# Patient Record
Sex: MALE | Race: WHITE
[De-identification: names, ages, dates, MRNs, and addresses within clinical notes are randomized per-mention and may not be internally consistent; named-entity substitution may affect disease eponyms.]

---

## 2021-04-26 ENCOUNTER — HOSPITAL ENCOUNTER (INPATIENT)
Dept: HOSPITAL 79 - ER1 | Age: 51
LOS: 2 days | Discharge: HOME | DRG: 189 | End: 2021-04-28
Attending: INTERNAL MEDICINE | Admitting: INTERNAL MEDICINE
Payer: COMMERCIAL

## 2021-04-26 VITALS — BODY MASS INDEX: 44.1 KG/M2 | WEIGHT: 315 LBS | HEIGHT: 71 IN

## 2021-04-26 DIAGNOSIS — Z83.3: ICD-10-CM

## 2021-04-26 DIAGNOSIS — D75.1: ICD-10-CM

## 2021-04-26 DIAGNOSIS — Z20.822: ICD-10-CM

## 2021-04-26 DIAGNOSIS — J96.22: ICD-10-CM

## 2021-04-26 DIAGNOSIS — Z79.4: ICD-10-CM

## 2021-04-26 DIAGNOSIS — I10: ICD-10-CM

## 2021-04-26 DIAGNOSIS — E11.9: ICD-10-CM

## 2021-04-26 DIAGNOSIS — J96.21: Primary | ICD-10-CM

## 2021-04-26 DIAGNOSIS — D69.6: ICD-10-CM

## 2021-04-26 DIAGNOSIS — J44.1: ICD-10-CM

## 2021-04-26 DIAGNOSIS — E66.2: ICD-10-CM

## 2021-04-26 DIAGNOSIS — Z82.49: ICD-10-CM

## 2021-04-26 DIAGNOSIS — R91.1: ICD-10-CM

## 2021-04-26 DIAGNOSIS — F17.210: ICD-10-CM

## 2021-04-26 LAB
BUN/CREATININE RATIO: 22 (ref 0–10)
HGB BLD-MCNC: 19.6 GM/DL (ref 14–17.5)
RED BLOOD COUNT: 6.68 M/UL (ref 4.2–5.5)
WHITE BLOOD COUNT: 8 K/UL (ref 4.5–11)

## 2021-04-27 LAB
BUN/CREATININE RATIO: 26 (ref 0–10)
HGB BLD-MCNC: 20.2 GM/DL (ref 14–17.5)
RED BLOOD COUNT: 6.98 M/UL (ref 4.2–5.5)
WHITE BLOOD COUNT: 8.8 K/UL (ref 4.5–11)

## 2021-04-28 LAB
BUN/CREATININE RATIO: 30 (ref 0–10)
HGB BLD-MCNC: 18.8 GM/DL (ref 14–17.5)
RED BLOOD COUNT: 6.75 M/UL (ref 4.2–5.5)
WHITE BLOOD COUNT: 12.9 K/UL (ref 4.5–11)

## 2021-05-03 ENCOUNTER — HOSPITAL ENCOUNTER (OUTPATIENT)
Dept: HOSPITAL 79 - SLEEP | Age: 51
End: 2021-05-03
Attending: INTERNAL MEDICINE
Payer: COMMERCIAL

## 2021-05-03 DIAGNOSIS — G47.33: Primary | ICD-10-CM

## 2021-05-03 DIAGNOSIS — G47.36: ICD-10-CM

## 2021-10-18 ENCOUNTER — HOSPITAL ENCOUNTER (OUTPATIENT)
Dept: HOSPITAL 79 - HEART 5 | Age: 51
End: 2021-10-18
Attending: INTERNAL MEDICINE
Payer: COMMERCIAL

## 2021-10-18 DIAGNOSIS — R06.02: Primary | ICD-10-CM

## 2022-01-22 ENCOUNTER — HOSPITAL ENCOUNTER (INPATIENT)
Dept: HOSPITAL 79 - ER1 | Age: 52
LOS: 6 days | Discharge: HOME | DRG: 291 | End: 2022-01-28
Attending: INTERNAL MEDICINE | Admitting: HOSPITALIST
Payer: COMMERCIAL

## 2022-01-22 VITALS — HEIGHT: 71 IN | BODY MASS INDEX: 44.1 KG/M2 | WEIGHT: 315 LBS

## 2022-01-22 DIAGNOSIS — E11.9: ICD-10-CM

## 2022-01-22 DIAGNOSIS — D75.1: ICD-10-CM

## 2022-01-22 DIAGNOSIS — I50.9: ICD-10-CM

## 2022-01-22 DIAGNOSIS — Z79.4: ICD-10-CM

## 2022-01-22 DIAGNOSIS — D69.6: ICD-10-CM

## 2022-01-22 DIAGNOSIS — Z83.3: ICD-10-CM

## 2022-01-22 DIAGNOSIS — I11.0: Primary | ICD-10-CM

## 2022-01-22 DIAGNOSIS — J44.1: ICD-10-CM

## 2022-01-22 DIAGNOSIS — E87.6: ICD-10-CM

## 2022-01-22 DIAGNOSIS — G47.33: ICD-10-CM

## 2022-01-22 DIAGNOSIS — R57.8: ICD-10-CM

## 2022-01-22 DIAGNOSIS — T42.75XA: ICD-10-CM

## 2022-01-22 DIAGNOSIS — Z82.49: ICD-10-CM

## 2022-01-22 DIAGNOSIS — Z99.81: ICD-10-CM

## 2022-01-22 DIAGNOSIS — Z20.822: ICD-10-CM

## 2022-01-22 DIAGNOSIS — N17.9: ICD-10-CM

## 2022-01-22 DIAGNOSIS — E66.2: ICD-10-CM

## 2022-01-22 DIAGNOSIS — J96.21: ICD-10-CM

## 2022-01-22 DIAGNOSIS — Z79.84: ICD-10-CM

## 2022-01-22 DIAGNOSIS — J96.22: ICD-10-CM

## 2022-01-22 DIAGNOSIS — Z79.82: ICD-10-CM

## 2022-01-22 DIAGNOSIS — F17.210: ICD-10-CM

## 2022-01-22 LAB
BUN/CREATININE RATIO: 17 (ref 0–10)
HGB BLD-MCNC: 21.2 GM/DL (ref 14–17.5)
RED BLOOD COUNT: 6.65 M/UL (ref 4.2–5.5)
WHITE BLOOD COUNT: 8.1 K/UL (ref 4.5–11)

## 2022-01-23 PROCEDURE — B24BZZZ ULTRASONOGRAPHY OF HEART WITH AORTA: ICD-10-PCS | Performed by: INTERNAL MEDICINE

## 2022-01-23 PROCEDURE — 5A09457 ASSISTANCE WITH RESPIRATORY VENTILATION, 24-96 CONSECUTIVE HOURS, CONTINUOUS POSITIVE AIRWAY PRESSURE: ICD-10-PCS | Performed by: HOSPITALIST

## 2022-01-24 LAB
BUN/CREATININE RATIO: 36 (ref 0–10)
HGB BLD-MCNC: 21.8 GM/DL (ref 14–17.5)
RED BLOOD COUNT: 6.85 M/UL (ref 4.2–5.5)
WHITE BLOOD COUNT: 4.6 K/UL (ref 4.5–11)

## 2022-01-25 PROCEDURE — 3E033XZ INTRODUCTION OF VASOPRESSOR INTO PERIPHERAL VEIN, PERCUTANEOUS APPROACH: ICD-10-PCS | Performed by: HOSPITALIST

## 2022-01-25 NOTE — NUR
PT BECOMING INCREASINGLY MORE CONFUSED AND RESTLESS. CONTINUES TO PULL OFF HIS
BIPAP MASK AND OXYGEN DESPITE BEING EDUCATED ABOUT HIS LABS AND NEED FOR IT.
02 SAT DROPS TO LOW 70'S. HE DOES SEEM CONFUSED ABOUT WHERE HE IS. NOTIFIED DR SEGURA AND ORDERS RECIEVED AND PLACED.

## 2022-01-26 LAB
BUN/CREATININE RATIO: 56 (ref 0–10)
HGB BLD-MCNC: 21.3 GM/DL (ref 14–17.5)
RED BLOOD COUNT: 6.85 M/UL (ref 4.2–5.5)
WHITE BLOOD COUNT: 7.1 K/UL (ref 4.5–11)

## 2022-01-26 PROCEDURE — 5A0945A ASSISTANCE WITH RESPIRATORY VENTILATION, 24-96 CONSECUTIVE HOURS, HIGH NASAL FLOW/VELOCITY: ICD-10-PCS | Performed by: HOSPITALIST

## 2022-01-27 LAB
BUN/CREATININE RATIO: 46 (ref 0–10)
HGB BLD-MCNC: 19.9 GM/DL (ref 14–17.5)
RED BLOOD COUNT: 6.77 M/UL (ref 4.2–5.5)
WHITE BLOOD COUNT: 7.8 K/UL (ref 4.5–11)

## 2022-01-28 LAB
BUN/CREATININE RATIO: 37 (ref 0–10)
HGB BLD-MCNC: 20.9 GM/DL (ref 14–17.5)
RED BLOOD COUNT: 7.07 M/UL (ref 4.2–5.5)
WHITE BLOOD COUNT: 8.5 K/UL (ref 4.5–11)

## 2022-02-05 ENCOUNTER — HOSPITAL ENCOUNTER (EMERGENCY)
Dept: HOSPITAL 79 - ER1 | Age: 52
Discharge: HOME | End: 2022-02-05
Payer: COMMERCIAL

## 2022-02-05 DIAGNOSIS — K59.00: Primary | ICD-10-CM

## 2022-02-05 DIAGNOSIS — J44.9: ICD-10-CM

## 2022-02-05 DIAGNOSIS — D45: ICD-10-CM

## 2022-02-05 DIAGNOSIS — I50.9: ICD-10-CM

## 2022-02-05 DIAGNOSIS — R10.9: ICD-10-CM

## 2022-02-05 DIAGNOSIS — E10.9: ICD-10-CM

## 2022-02-05 LAB
BUN/CREATININE RATIO: 41 (ref 0–10)
HGB BLD-MCNC: 21.1 GM/DL (ref 14–17.5)
RED BLOOD COUNT: 6.71 M/UL (ref 4.2–5.5)
WHITE BLOOD COUNT: 15.5 K/UL (ref 4.5–11)

## 2022-03-10 ENCOUNTER — HOSPITAL ENCOUNTER (OUTPATIENT)
Dept: HOSPITAL 79 - KOH-I | Age: 52
End: 2022-03-10
Attending: INTERNAL MEDICINE
Payer: COMMERCIAL

## 2022-03-10 DIAGNOSIS — R91.1: Primary | ICD-10-CM
